# Patient Record
Sex: FEMALE | Race: WHITE
[De-identification: names, ages, dates, MRNs, and addresses within clinical notes are randomized per-mention and may not be internally consistent; named-entity substitution may affect disease eponyms.]

---

## 2017-08-04 ENCOUNTER — HOSPITAL ENCOUNTER (OUTPATIENT)
Dept: HOSPITAL 47 - RADECHMAIN | Age: 72
Discharge: HOME | End: 2017-08-04
Payer: MEDICARE

## 2017-08-04 DIAGNOSIS — I08.1: Primary | ICD-10-CM

## 2017-08-04 PROCEDURE — 93306 TTE W/DOPPLER COMPLETE: CPT

## 2017-08-05 NOTE — ECHOF
Referral Reason:R06.01 Orthopnea



MEASUREMENTS

--------

HEIGHT: 170.2 cm

WEIGHT: 115.2 kg

BP: 139/65

RVIDd:   2.4 cm     (< 3.3)

IVSd:   1.3 cm     (0.6 - 1.1)

LVIDd:   4.4 cm     (3.9 - 5.3)

LVPWd:   1.4 cm     (0.6 - 1.1)

IVSs:   1.7 cm

LVIDs:   2.7 cm

LVPWs:   1.8 cm

LAESV Index (A-L):   10.86 ml/m

Ao Diam:   3.3 cm     (2.0 - 3.7)

AV Cusp:   1.9 cm     (1.5 - 2.6)

LA Diam:   3.1 cm     (2.7 - 3.8)

MV EXCURSION:   18.048 mm     (> 18.000)

MV EF SLOPE:   58 mm/s     (70 - 150)

EPSS:   1.1 cm

MV E Louie:   0.68 m/s

MV DecT:   263 ms

MV A Louie:   0.80 m/s

MV E/A Ratio:   0.85 

RAP:   5.00 mmHg

RVSP:   10.82 mmHg







FINDINGS

--------

Sinus rhythm.

This was a technically adequate study.

The left ventricular size is normal.   There is 

moderate concentric left ventricular hypertrophy.   

Overall left ventricular systolic function is normal 

with, an EF between 55 - 60 %.

The right ventricle is normal in size and function.

Normal LA  size by volume 22+/-6 ml/m2.

The right atrium is normal in size.

The aortic valve is trileaflet, and appears 

structurally normal. No aortic stenosis or 

regurgitation.

The mitral valve is normal.   There is trace to mild 

mitral regurgitation.

Trace tricuspid regurgitation present.   There is no 

evidence of pulmonary hypertension.   The right 

ventricular systolic pressure, as measured by Doppler, 

is 10.82mmHg.

The pulmonic valve was not well visualized.   There is 

no pulmonic regurgitation present.

The aortic root size is normal.

Normal inferior vena cava with normal inspiratory 

collapse consistent with estimated right atrial 

pressure of  5 mmHg.

There is no pericardial effusion.



CONCLUSIONS

--------

1. Sinus rhythm.

2. The right ventricular systolic pressure, as measured by 

Doppler, is 10.82mmHg.

3. The pulmonic valve was not well visualized.

4. There is no pulmonic regurgitation present.

5. The aortic root size is normal.

6. There is no pericardial effusion.

7. This was a technically adequate study.

8. There is moderate concentric left ventricular 

hypertrophy.

9. Overall left ventricular systolic function is normal 

with, an EF between 55 - 60 %.

10. Normal LA size by volume 22+/-6 ml/m2.

11. The aortic valve is trileaflet, and appears 

structurally normal. No aortic stenosis or 

regurgitation.

12. There is trace to mild mitral regurgitation.

13. Trace tricuspid regurgitation present.

14. There is no evidence of pulmonary hypertension.





SONOGRAPHER: Raymon Leiva RDCS

## 2017-08-22 ENCOUNTER — HOSPITAL ENCOUNTER (OUTPATIENT)
Dept: HOSPITAL 47 - MNTWWP | Age: 72
Discharge: HOME | End: 2017-08-22
Payer: MEDICARE

## 2017-08-22 VITALS — BODY MASS INDEX: 38.8 KG/M2

## 2017-08-22 DIAGNOSIS — E66.01: Primary | ICD-10-CM

## 2017-08-22 PROCEDURE — 97802 MEDICAL NUTRITION INDIV IN: CPT

## 2020-08-14 ENCOUNTER — HOSPITAL ENCOUNTER (OUTPATIENT)
Dept: HOSPITAL 47 - ORWHC2ENDO | Age: 75
Discharge: HOME | End: 2020-08-14
Attending: INTERNAL MEDICINE
Payer: MEDICARE

## 2020-08-14 ENCOUNTER — HOSPITAL ENCOUNTER (INPATIENT)
Dept: HOSPITAL 47 - EC | Age: 75
LOS: 4 days | Discharge: HOME | DRG: 920 | End: 2020-08-18
Attending: HOSPITALIST | Admitting: HOSPITALIST
Payer: MEDICARE

## 2020-08-14 VITALS — RESPIRATION RATE: 17 BRPM | HEART RATE: 79 BPM | SYSTOLIC BLOOD PRESSURE: 122 MMHG | DIASTOLIC BLOOD PRESSURE: 62 MMHG

## 2020-08-14 VITALS — BODY MASS INDEX: 41.1 KG/M2

## 2020-08-14 VITALS — TEMPERATURE: 98.2 F

## 2020-08-14 DIAGNOSIS — Z88.8: ICD-10-CM

## 2020-08-14 DIAGNOSIS — Z96.652: ICD-10-CM

## 2020-08-14 DIAGNOSIS — D12.2: Primary | ICD-10-CM

## 2020-08-14 DIAGNOSIS — E07.9: ICD-10-CM

## 2020-08-14 DIAGNOSIS — Z88.2: ICD-10-CM

## 2020-08-14 DIAGNOSIS — Z82.5: ICD-10-CM

## 2020-08-14 DIAGNOSIS — I10: ICD-10-CM

## 2020-08-14 DIAGNOSIS — Z82.49: ICD-10-CM

## 2020-08-14 DIAGNOSIS — I78.0: ICD-10-CM

## 2020-08-14 DIAGNOSIS — E03.9: ICD-10-CM

## 2020-08-14 DIAGNOSIS — Z79.890: ICD-10-CM

## 2020-08-14 DIAGNOSIS — Z90.49: ICD-10-CM

## 2020-08-14 DIAGNOSIS — Z87.891: ICD-10-CM
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
## 2020-08-14 DIAGNOSIS — D12.4: ICD-10-CM

## 2020-08-14 DIAGNOSIS — K21.9: ICD-10-CM

## 2020-08-14 DIAGNOSIS — Z98.51: ICD-10-CM

## 2020-08-14 DIAGNOSIS — Z87.19: ICD-10-CM

## 2020-08-14 DIAGNOSIS — Z79.899: ICD-10-CM

## 2020-08-14 DIAGNOSIS — K91.840: Primary | ICD-10-CM

## 2020-08-14 DIAGNOSIS — D12.5: ICD-10-CM

## 2020-08-14 DIAGNOSIS — D62: ICD-10-CM

## 2020-08-14 DIAGNOSIS — K63.5: ICD-10-CM

## 2020-08-14 DIAGNOSIS — Z98.890: ICD-10-CM

## 2020-08-14 DIAGNOSIS — Z83.2: ICD-10-CM

## 2020-08-14 DIAGNOSIS — E87.2: ICD-10-CM

## 2020-08-14 LAB
ALBUMIN SERPL-MCNC: 4.1 G/DL (ref 3.5–5)
ALP SERPL-CCNC: 81 U/L (ref 38–126)
ALT SERPL-CCNC: 16 U/L (ref 4–34)
ANION GAP SERPL CALC-SCNC: 8 MMOL/L
APTT BLD: 22.4 SEC (ref 22–30)
AST SERPL-CCNC: 24 U/L (ref 14–36)
BASOPHILS # BLD AUTO: 0.1 K/UL (ref 0–0.2)
BASOPHILS NFR BLD AUTO: 1 %
BUN SERPL-SCNC: 14 MG/DL (ref 7–17)
CALCIUM SPEC-MCNC: 9.1 MG/DL (ref 8.4–10.2)
CHLORIDE SERPL-SCNC: 102 MMOL/L (ref 98–107)
CO2 SERPL-SCNC: 25 MMOL/L (ref 22–30)
EOSINOPHIL # BLD AUTO: 0.1 K/UL (ref 0–0.7)
EOSINOPHIL NFR BLD AUTO: 1 %
ERYTHROCYTE [DISTWIDTH] IN BLOOD BY AUTOMATED COUNT: 4.66 M/UL (ref 3.8–5.4)
ERYTHROCYTE [DISTWIDTH] IN BLOOD: 12.9 % (ref 11.5–15.5)
GLUCOSE BLD-MCNC: 134 MG/DL (ref 75–99)
GLUCOSE SERPL-MCNC: 126 MG/DL (ref 74–99)
HCT VFR BLD AUTO: 41.5 % (ref 34–46)
HGB BLD-MCNC: 13.7 GM/DL (ref 11.4–16)
INR PPP: 0.9 (ref ?–1.2)
LYMPHOCYTES # SPEC AUTO: 1.8 K/UL (ref 1–4.8)
LYMPHOCYTES NFR SPEC AUTO: 17 %
MCH RBC QN AUTO: 29.4 PG (ref 25–35)
MCHC RBC AUTO-ENTMCNC: 33 G/DL (ref 31–37)
MCV RBC AUTO: 89 FL (ref 80–100)
MONOCYTES # BLD AUTO: 0.5 K/UL (ref 0–1)
MONOCYTES NFR BLD AUTO: 5 %
NEUTROPHILS # BLD AUTO: 7.7 K/UL (ref 1.3–7.7)
NEUTROPHILS NFR BLD AUTO: 75 %
PLATELET # BLD AUTO: 287 K/UL (ref 150–450)
POTASSIUM SERPL-SCNC: 3.8 MMOL/L (ref 3.5–5.1)
PROT SERPL-MCNC: 6.8 G/DL (ref 6.3–8.2)
PT BLD: 9.8 SEC (ref 9–12)
SODIUM SERPL-SCNC: 135 MMOL/L (ref 137–145)
WBC # BLD AUTO: 10.3 K/UL (ref 3.8–10.6)

## 2020-08-14 PROCEDURE — 84132 ASSAY OF SERUM POTASSIUM: CPT

## 2020-08-14 PROCEDURE — 85730 THROMBOPLASTIN TIME PARTIAL: CPT

## 2020-08-14 PROCEDURE — 99284 EMERGENCY DEPT VISIT MOD MDM: CPT

## 2020-08-14 PROCEDURE — 86850 RBC ANTIBODY SCREEN: CPT

## 2020-08-14 PROCEDURE — 45382 COLONOSCOPY W/CONTROL BLEED: CPT

## 2020-08-14 PROCEDURE — 88305 TISSUE EXAM BY PATHOLOGIST: CPT

## 2020-08-14 PROCEDURE — 86900 BLOOD TYPING SEROLOGIC ABO: CPT

## 2020-08-14 PROCEDURE — 84484 ASSAY OF TROPONIN QUANT: CPT

## 2020-08-14 PROCEDURE — 80048 BASIC METABOLIC PNL TOTAL CA: CPT

## 2020-08-14 PROCEDURE — 85027 COMPLETE CBC AUTOMATED: CPT

## 2020-08-14 PROCEDURE — 96374 THER/PROPH/DIAG INJ IV PUSH: CPT

## 2020-08-14 PROCEDURE — 85610 PROTHROMBIN TIME: CPT

## 2020-08-14 PROCEDURE — 36415 COLL VENOUS BLD VENIPUNCTURE: CPT

## 2020-08-14 PROCEDURE — 45385 COLONOSCOPY W/LESION REMOVAL: CPT

## 2020-08-14 PROCEDURE — 93005 ELECTROCARDIOGRAM TRACING: CPT

## 2020-08-14 PROCEDURE — 80053 COMPREHEN METABOLIC PANEL: CPT

## 2020-08-14 PROCEDURE — 86901 BLOOD TYPING SEROLOGIC RH(D): CPT

## 2020-08-14 PROCEDURE — 85025 COMPLETE CBC W/AUTO DIFF WBC: CPT

## 2020-08-14 RX ADMIN — POTASSIUM CHLORIDE SCH MLS/HR: 14.9 INJECTION, SOLUTION INTRAVENOUS at 21:33

## 2020-08-14 NOTE — P.HPIM
History of Present Illness


H&P Date: 08/14/20





Patient is a 74-year-old female with a PMH of Osler-Weber-Rendu syndrome 

(diagnosed after mother had recurrent severe epistaxis and was diagnosed, with 

patient being subsequently diagnosed as well), hypertension, and hypothyroidism 

who presented to the ED with GI bleeding.  The patient underwent a diagnostic 

colonoscopy earlier today after a screening cologuard was abnormal.  Patient was

informed that she had 3 polyps that were removed and she was subsequently 

discharged in stable condition at around 1 PM to home.  She was at home when she

suddenly developed lower abdominal pressure sensation and subsequent bloody 

bowel movements at around 3 PM.  She reports that her initial bowel movement was

somewhat dark but then the subsequent movements became more and more bright red.

 She had 3 bowel movements at home, 3 while waiting in the emergency room, and 3

after being placed in a room in the emergency room.  She denied abdominal pain 

though notes experiencing some lightheadedness when trying to stand up.  Denied 

any additional complaints.  Denied any previous history of bleeding.  Denied 

nausea, vomiting, fever, chills, chest pain, or shortness of breath.  In the 

emergency room, hemoglobin was 13.7, platelets 287, troponin less than 0.012, 

sodium 135, potassium 3.8, BUN 14, and creatinine 0.73.  EKG revealed normal 

sinus rhythm at 93 bpm. 





Review of Systems





Pertinent positives and negatives as discussed in HPI, a complete review of 

systems was performed and all other systems are negative.





Past Medical History


Past Medical History: Blood Disorder, GERD/Reflux, Hypertension, Thyroid 

Disorder


Additional Past Medical History / Comment(s): HX OWRD (OSLER BUNCH RENDU 

DISEASE-BLOOD DISORDER).  + COLOGARD


History of Any Multi-Drug Resistant Organisms: None Reported


Past Surgical History: Appendectomy, Joint Replacement, Orthopedic Surgery, 

Tubal Ligation


Additional Past Surgical History / Comment(s): BILAT CTR.  LT TKA.  COLONOSCOPY


Past Anesthesia/Blood Transfusion Reactions: No Reported Reaction


Past Psychological History: No Psychological Hx Reported


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


Smoking Status: Former smoker


Past Alcohol Use History: None Reported


Additional Past Alcohol Use History / Comment(s): QUIT SMOKING 1985


Past Drug Use History: None Reported





- Past Family History


  ** Mother


Family Medical History: Blood Disorder, COPD


Additional Family Medical History / Comment(s): OWRD-BLEEDING DISORDER,





  ** Father


Family Medical History: Cancer, Congestive Heart Failure (CHF)


Additional Family Medical History / Comment(s): SKIN,





Medications and Allergies


                                Home Medications











 Medication  Instructions  Recorded  Confirmed  Type


 


Benazepril [Lotensin] 10 mg PO DAILY 08/12/20 08/14/20 History


 


Levothyroxine Sodium [Synthroid] 88 mcg PO DAILY 08/12/20 08/14/20 History


 


Omeprazole 20 mg PO DAILY 08/12/20 08/14/20 History


 


amLODIPine [Norvasc] 5 mg PO DAILY 08/12/20 08/14/20 History


 


hydroCHLOROthiazide [Hydrodiuril] 25 mg PO DAILY 08/12/20 08/14/20 History


 


metroNIDAZOLE [metroNIDAZOLE 0.75% 1 applic TOPICAL BID PRN 08/14/20 08/14/20 

History





Gel]    








                                    Allergies











Allergy/AdvReac Type Severity Reaction Status Date / Time


 


Sulfa (Sulfonamide Allergy  Anaphylaxis Verified 08/14/20 21:07





Antibiotics)     


 


rosuvastatin [From Crestor] AdvReac  SEVERE Verified 08/14/20 21:07





   JOINT PAIN  














Physical Exam


Vitals: 


                                   Vital Signs











  Temp Pulse Resp BP Pulse Ox


 


 08/14/20 21:07   88  181 H  132/85  95


 


 08/14/20 18:31  98.3 F  98  16  138/82  96








                                Intake and Output











 08/14/20 08/14/20 08/15/20





 14:59 22:59 06:59


 


Other:   


 


  Weight  120.1 kg 














General: non toxic, no distress, appears at stated age, morbidly obese


Derm: no unusual rashes/lesions no unusual ecchymoses, warm, dry


Head: atraumatic, normocephalic, symmetric


Eyes: EOMI, no lid lag, anicteric sclera, pupils equal round reactive to light


ENT: Nose and ears atraumatic, no thrush,  no pharyngeal erythema


Neck: No thyromegaly, no cervical lymphadenopathy, trachea midline, supple


Mouth: no lip lesion, mucus membranes moist


Cardiovascular: S1S2 reg, no murmur, positive posterior tibial pulse bilateral, 

no edema, capillary refill less than 2 seconds


Lungs: CTA bilateral, no rhonchi, no rales , no accessory muscle use


Abdominal: soft,  nontender to palpation, no guarding, no appreciable 

organomegaly, normal bowel sounds


Ext: no gross muscle atrophy,  muscle strength 5 out of 5 in all 4 extremities 

grossly, no contractures, 


Neuro:  CN II-XI grossly intact, light touch intact all 4 extremities, finger to

nose within normal limits,


Psych: Alert, oriented, appropriate affect 





Results


CBC & Chem 7: 


                                 08/14/20 19:30





                                 08/14/20 19:30


Labs: 


                  Abnormal Lab Results - Last 24 Hours (Table)











  08/14/20 08/14/20 Range/Units





  19:30 21:47 


 


Sodium  135 L   (137-145)  mmol/L


 


Glucose  126 H   (74-99)  mg/dL


 


POC Glucose (mg/dL)   134 H  (75-99)  mg/dL














Thrombosis Risk Factor Assmnt





- Choose All That Apply


Each Factor Represents 1 point: Obesity (BMI >25)


Each Risk Factor Represents 2 Points: Age 61-74 years


Thrombosis Risk Factor Assessment Total Risk Factor Score: 3


Thrombosis Risk Factor Assessment Level: Moderate Risk





Assessment and Plan


Plan: 





Acute GI bleed, postoperatively after multiple polyp removal, with history of 

Osler-Weber-Rendu syndrome


-Continue with Protonix 40 mg IVP twice a day


-GI consult


-Nothing by mouth for now


-Monitor CBC every 6 hourly


-Continue with IV fluids


-Cardiac monitoring





Chronic conditions: Hypertension, hypothyroidism


-Hold off on antihypertensives for now


-Continue with levothyroxine





DVT prophylaxis


-IPCDs





The patient is admitted with an anticipated greater than 2 midnight stay for 

evaluation of GI bleed


CODE STATUS: Full Code


Discussed with: Patient


Anticipated discharge date: 2-3 days


Anticipated discharge place: Home


A total of 40 minutes was spent on the care of this complex patient more than 

50% of the time was spent in counseling and care coordination.

## 2020-08-14 NOTE — P.PCN
Date of Procedure: 08/14/20


Implants: 


BRIEF HISTORY: Patient is a 74-year-old pleasant female scheduled for an 

elective colonoscopy as a part of evaluation of positive cologuard





PROCEDURE PERFORMED: Colonoscopy snare polypectomy. 





PREOPERATIVE DIAGNOSIS: positive cologuard 





IV sedation per Anesthesia. 





PROCEDURE: After informed consent was obtained, the patient, was brought into 

the endoscopy unit. IV sedation was administered by Anesthesia under continuous 

monitoring.  Digital rectal examination was normal. Initially the Olympus CF-160

flexible video colonoscope was then inserted in the rectum, gradually advanced 

into the cecum without any difficulty. Careful examination was performed as the 

scope was gradually being withdrawn. Ileocecal valve and the appendiceal orifice

were visualized and appeared normal.  There was a 3 cm broad-based polyp 

adjacent to the appendiceal orifice which was removed by piecemeal snare salvador

ypectomy and complete polypectomy could not be accomplished.  In the ascending 

colon there was a 1 cm polyp removed by snare polypectomy.  In the descending 

colon there was a 1 cm flat polyp removed by snare polypectomy.  In the; there 

was a 2 cm polyp removed by snare polypectomy.  The rectum appeared normal. 

Retroflexion was performed in the rectum and no lesions were seen. The patient 

tolerated the procedure well. 





IMPRESSION: 


3 cm broad-based cecal polyp adjacent to the appendiceal orifice and status post

snare polypectomy ( complete polypectomy not accomplished)


1 cm ascending colon polyp status post polypectomy


1. centimeters meter flat descending colon polyp status post polypectomy


2 cm; sigmoid colon polyp status post polypectomy





RECOMMENDATIONS:  Findings of this examination were discussed with the patient 

as well as a family.  She was advised to follow with the biopsy results.   If 

the biopsy results will plan a repeat colonoscopy in 3-6 months

## 2020-08-14 NOTE — ED
General Adult HPI





<Jose Beard - Last Filed: 08/14/20 20:52>





- General


Source: patient, RN notes reviewed


Mode of arrival: ambulatory


Limitations: no limitations





<Tadeo Sánchez - Last Filed: 08/14/20 20:59>





- General


Chief complaint: GI Bleed


Stated complaint: surgery complications


Time Seen by Provider: 08/14/20 19:04





- History of Present Illness


Initial comments: 





74-year-old female with a past medical history of GERD, hypertension, as Osler 

Bunch Rendu disease presents to the emergency room for a chief complaint of 

rectal bleeding.  Patient states she had a colonoscopy performed around 10 AM 

this morning where she reports that for polyps were removed.  This was performed

by Dr. Mazariegos.  Reports that she had had some bleeding since that time.  States 

it started as a dark red blood and then progressed to bright red blood.  Patient

reports that she has turned the toilet bowl water completely red about 5 times. 

Patient denies any other symptoms.  Patient called Dr. Mazariegos who reported she 

should return to the emergency room.Patient has no other complaints at this time

including shortness of breath, chest pain, abdominal pain, nausea or vomiting, 

headache, or visual changes. (Tadeo Sánchez)





- Related Data


                                Home Medications











 Medication  Instructions  Recorded  Confirmed


 


Benazepril [Lotensin] 10 mg PO DAILY 08/12/20 08/14/20


 


Levothyroxine Sodium [Synthroid] 88 mcg PO DAILY 08/12/20 08/14/20


 


Omeprazole 20 mg PO DAILY 08/12/20 08/14/20


 


amLODIPine [Norvasc] 5 mg PO DAILY 08/12/20 08/14/20


 


hydroCHLOROthiazide [Hydrodiuril] 25 mg PO DAILY 08/12/20 08/14/20











                                    Allergies











Allergy/AdvReac Type Severity Reaction Status Date / Time


 


rosuvastatin [From Crestor] AdvReac  SEVERE Verified 08/14/20 18:33





   JOINT PAIN  


 


Sulfa (Sulfonamide AdvReac  Anaphylaxis Verified 08/14/20 18:33





Antibiotics)     














Review of Systems


ROS Other: All systems not noted in ROS Statement are negative.





<Jose Beard - Last Filed: 08/14/20 20:52>


ROS Other: All systems not noted in ROS Statement are negative.





<Tadeo Sánchez - Last Filed: 08/14/20 20:59>


ROS Statement: 


Those systems with pertinent positive or pertinent negative responses have been 

documented in the HPI.








Past Medical History


Past Medical History: Blood Disorder, GERD/Reflux, Hypertension, Thyroid 

Disorder


Additional Past Medical History / Comment(s): HX OWRD (OSLER BUNCH RENDU 

DISEASE-BLOOD DISORDER).  + COLOGARD


History of Any Multi-Drug Resistant Organisms: None Reported


Past Surgical History: Appendectomy, Joint Replacement, Orthopedic Surgery, 

Tubal Ligation


Additional Past Surgical History / Comment(s): BILAT CTR.  LT TKA.  COLONOSCOPY


Past Anesthesia/Blood Transfusion Reactions: No Reported Reaction


Past Psychological History: No Psychological Hx Reported


Smoking Status: Former smoker


Past Alcohol Use History: None Reported


Past Drug Use History: None Reported





- Past Family History


  ** Mother


Family Medical History: Blood Disorder


Additional Family Medical History / Comment(s): OWRD-BLEEDING DISORDER





  ** Father


Family Medical History: Cancer


Additional Family Medical History / Comment(s): SKIN





<Tadeo Sánchez - Last Filed: 08/14/20 20:59>





General Exam


Limitations: no limitations


General appearance: alert, in no apparent distress


Head exam: Present: atraumatic, normocephalic, normal inspection


Eye exam: Present: normal appearance, PERRL, EOMI.  Absent: scleral icterus, 

conjunctival injection, periorbital swelling


ENT exam: Present: normal exam, mucous membranes moist


Neck exam: Present: normal inspection, full ROM.  Absent: tenderness, 

meningismus, lymphadenopathy


Respiratory exam: Present: normal lung sounds bilaterally.  Absent: respiratory 

distress, wheezes, rales, rhonchi, stridor


Cardiovascular Exam: Present: regular rate, normal rhythm, normal heart sounds. 

Absent: systolic murmur, diastolic murmur, rubs, gallop, clicks


GI/Abdominal exam: Present: soft, normal bowel sounds.  Absent: distended, 

tenderness, guarding, rebound, rigid


Neurological exam: Present: alert





<Tadeo Sánchez - Last Filed: 08/14/20 20:59>





Course





<Jose Beard - Last Filed: 08/14/20 20:52>


                                   Vital Signs











  08/14/20





  18:31


 


Temperature 98.3 F


 


Pulse Rate 98


 


Respiratory 16





Rate 


 


Blood Pressure 138/82


 


O2 Sat by Pulse 96





Oximetry 














- Reevaluation(s)


Reevaluation #1: 





08/14/20 20:47


Patient reevaluated and reexamined by myself, Dr. Beard.  I agree with PA 

findings.  This includes diagnostic interpretation and treatment plan.  Patient 

resting comfortably in bed.  Blood work and vital signs reviewed.  Patient 

updated on results and plan


08/14/20 20:52


Case discussed with Dr. salomon with GI who would like patient admitted to 

medicine.  [facility] physician group has been paged covering for Dr. Taylor.  Case 

also discussed with Dr. Whitten, who agrees with ICU admission and will consult. 

(Jose Beard)





EKG Findings





- EKG Comments:


EKG Findings:: Normal sinus rhythm 93.  .  QRS 84.  .  .  

Left axis.  Normal QRS.  No acute ST change.





<Jose Beard - Last Filed: 08/14/20 20:52>





Medical Decision Making





- Lab Data


Result diagrams: 


                                 08/14/20 19:30





                                 08/14/20 19:30





<Jose Beard - Last Filed: 08/14/20 20:52>





- Lab Data


Result diagrams: 


                                 08/14/20 19:30





                                 08/14/20 19:30





<Tadeo Sánchez - Last Filed: 08/14/20 20:59>





- Medical Decision Making





Vitals are stable.  CBC CMP is unremarkable.hemoglobin is stable at 13.7.  

Patient is well-appearing however on my evaluation she did have hematochezia 

noted in the toilet bowl.Dr. Mazariegos was contacted, rpatient was admitted to 

medicine. (Tadeo Sánchez)





- Lab Data


                                   Lab Results











  08/14/20 08/14/20 08/14/20 Range/Units





  19:30 19:30 19:30 


 


WBC  10.3    (3.8-10.6)  k/uL


 


RBC  4.66    (3.80-5.40)  m/uL


 


Hgb  13.7    (11.4-16.0)  gm/dL


 


Hct  41.5    (34.0-46.0)  %


 


MCV  89.0    (80.0-100.0)  fL


 


MCH  29.4    (25.0-35.0)  pg


 


MCHC  33.0    (31.0-37.0)  g/dL


 


RDW  12.9    (11.5-15.5)  %


 


Plt Count  287    (150-450)  k/uL


 


Neutrophils %  75    %


 


Lymphocytes %  17    %


 


Monocytes %  5    %


 


Eosinophils %  1    %


 


Basophils %  1    %


 


Neutrophils #  7.7    (1.3-7.7)  k/uL


 


Lymphocytes #  1.8    (1.0-4.8)  k/uL


 


Monocytes #  0.5    (0-1.0)  k/uL


 


Eosinophils #  0.1    (0-0.7)  k/uL


 


Basophils #  0.1    (0-0.2)  k/uL


 


PT   9.8   (9.0-12.0)  sec


 


INR   0.9   (<1.2)  


 


APTT   22.4   (22.0-30.0)  sec


 


Sodium    135 L  (137-145)  mmol/L


 


Potassium    3.8  (3.5-5.1)  mmol/L


 


Chloride    102  ()  mmol/L


 


Carbon Dioxide    25  (22-30)  mmol/L


 


Anion Gap    8  mmol/L


 


BUN    14  (7-17)  mg/dL


 


Creatinine    0.73  (0.52-1.04)  mg/dL


 


Est GFR (CKD-EPI)AfAm    >90  (>60 ml/min/1.73 sqM)  


 


Est GFR (CKD-EPI)NonAf    83  (>60 ml/min/1.73 sqM)  


 


Glucose    126 H  (74-99)  mg/dL


 


Calcium    9.1  (8.4-10.2)  mg/dL


 


Total Bilirubin    0.6  (0.2-1.3)  mg/dL


 


AST    24  (14-36)  U/L


 


ALT    16  (4-34)  U/L


 


Alkaline Phosphatase    81  ()  U/L


 


Troponin I     (0.000-0.034)  ng/mL


 


Total Protein    6.8  (6.3-8.2)  g/dL


 


Albumin    4.1  (3.5-5.0)  g/dL


 


Blood Type     


 


Blood Type Confirm     


 


Blood Type Recheck     


 


Bld Type Recheck Status     


 


Antibody Screen     


 


Spec Expiration Date     














  08/14/20 08/14/20 08/14/20 Range/Units





  19:30 19:30 20:10 


 


WBC     (3.8-10.6)  k/uL


 


RBC     (3.80-5.40)  m/uL


 


Hgb     (11.4-16.0)  gm/dL


 


Hct     (34.0-46.0)  %


 


MCV     (80.0-100.0)  fL


 


MCH     (25.0-35.0)  pg


 


MCHC     (31.0-37.0)  g/dL


 


RDW     (11.5-15.5)  %


 


Plt Count     (150-450)  k/uL


 


Neutrophils %     %


 


Lymphocytes %     %


 


Monocytes %     %


 


Eosinophils %     %


 


Basophils %     %


 


Neutrophils #     (1.3-7.7)  k/uL


 


Lymphocytes #     (1.0-4.8)  k/uL


 


Monocytes #     (0-1.0)  k/uL


 


Eosinophils #     (0-0.7)  k/uL


 


Basophils #     (0-0.2)  k/uL


 


PT     (9.0-12.0)  sec


 


INR     (<1.2)  


 


APTT     (22.0-30.0)  sec


 


Sodium     (137-145)  mmol/L


 


Potassium     (3.5-5.1)  mmol/L


 


Chloride     ()  mmol/L


 


Carbon Dioxide     (22-30)  mmol/L


 


Anion Gap     mmol/L


 


BUN     (7-17)  mg/dL


 


Creatinine     (0.52-1.04)  mg/dL


 


Est GFR (CKD-EPI)AfAm     (>60 ml/min/1.73 sqM)  


 


Est GFR (CKD-EPI)NonAf     (>60 ml/min/1.73 sqM)  


 


Glucose     (74-99)  mg/dL


 


Calcium     (8.4-10.2)  mg/dL


 


Total Bilirubin     (0.2-1.3)  mg/dL


 


AST     (14-36)  U/L


 


ALT     (4-34)  U/L


 


Alkaline Phosphatase     ()  U/L


 


Troponin I  <0.012    (0.000-0.034)  ng/mL


 


Total Protein     (6.3-8.2)  g/dL


 


Albumin     (3.5-5.0)  g/dL


 


Blood Type   AB Positive   


 


Blood Type Confirm    AB Positive  


 


Blood Type Recheck   No Previous Record   


 


Bld Type Recheck Status   CABO Indicated   


 


Antibody Screen   NEGATIVE   


 


Spec Expiration Date   08/17/2020 - 2330   














Disposition





<Jose Beard - Last Filed: 08/14/20 20:52>


Is patient prescribed a controlled substance at d/c from ED?: No


Time of Disposition: 20:59





<Tadeo Sánchez - Last Filed: 08/14/20 20:59>


Clinical Impression: 


 Hematochezia





Disposition: ADMITTED AS IP TO THIS HOSP


Referrals: 


Tariq Dasilva MD [Primary Care Provider] - 1-2 days

## 2020-08-15 LAB
ANION GAP SERPL CALC-SCNC: 6 MMOL/L
BASOPHILS # BLD AUTO: 0 K/UL (ref 0–0.2)
BASOPHILS # BLD AUTO: 0 K/UL (ref 0–0.2)
BASOPHILS NFR BLD AUTO: 0 %
BASOPHILS NFR BLD AUTO: 0 %
BUN SERPL-SCNC: 12 MG/DL (ref 7–17)
CALCIUM SPEC-MCNC: 8.4 MG/DL (ref 8.4–10.2)
CHLORIDE SERPL-SCNC: 104 MMOL/L (ref 98–107)
CO2 SERPL-SCNC: 23 MMOL/L (ref 22–30)
EOSINOPHIL # BLD AUTO: 0.1 K/UL (ref 0–0.7)
EOSINOPHIL # BLD AUTO: 0.1 K/UL (ref 0–0.7)
EOSINOPHIL NFR BLD AUTO: 1 %
EOSINOPHIL NFR BLD AUTO: 1 %
ERYTHROCYTE [DISTWIDTH] IN BLOOD BY AUTOMATED COUNT: 3.41 M/UL (ref 3.8–5.4)
ERYTHROCYTE [DISTWIDTH] IN BLOOD BY AUTOMATED COUNT: 3.67 M/UL (ref 3.8–5.4)
ERYTHROCYTE [DISTWIDTH] IN BLOOD BY AUTOMATED COUNT: 3.72 M/UL (ref 3.8–5.4)
ERYTHROCYTE [DISTWIDTH] IN BLOOD BY AUTOMATED COUNT: 3.81 M/UL (ref 3.8–5.4)
ERYTHROCYTE [DISTWIDTH] IN BLOOD BY AUTOMATED COUNT: 3.84 M/UL (ref 3.8–5.4)
ERYTHROCYTE [DISTWIDTH] IN BLOOD: 13 % (ref 11.5–15.5)
ERYTHROCYTE [DISTWIDTH] IN BLOOD: 13.2 % (ref 11.5–15.5)
ERYTHROCYTE [DISTWIDTH] IN BLOOD: 13.3 % (ref 11.5–15.5)
GLUCOSE BLD-MCNC: 127 MG/DL (ref 75–99)
GLUCOSE SERPL-MCNC: 127 MG/DL (ref 74–99)
HCT VFR BLD AUTO: 31 % (ref 34–46)
HCT VFR BLD AUTO: 34.3 % (ref 34–46)
HCT VFR BLD AUTO: 34.3 % (ref 34–46)
HCT VFR BLD AUTO: 34.5 % (ref 34–46)
HCT VFR BLD AUTO: 34.7 % (ref 34–46)
HGB BLD-MCNC: 10.2 GM/DL (ref 11.4–16)
HGB BLD-MCNC: 10.7 GM/DL (ref 11.4–16)
HGB BLD-MCNC: 11.1 GM/DL (ref 11.4–16)
HGB BLD-MCNC: 11.3 GM/DL (ref 11.4–16)
HGB BLD-MCNC: 11.6 GM/DL (ref 11.4–16)
LYMPHOCYTES # SPEC AUTO: 1.2 K/UL (ref 1–4.8)
LYMPHOCYTES # SPEC AUTO: 1.9 K/UL (ref 1–4.8)
LYMPHOCYTES NFR SPEC AUTO: 15 %
LYMPHOCYTES NFR SPEC AUTO: 27 %
MCH RBC QN AUTO: 29.2 PG (ref 25–35)
MCH RBC QN AUTO: 29.6 PG (ref 25–35)
MCH RBC QN AUTO: 29.8 PG (ref 25–35)
MCH RBC QN AUTO: 30 PG (ref 25–35)
MCH RBC QN AUTO: 30.2 PG (ref 25–35)
MCHC RBC AUTO-ENTMCNC: 31.3 G/DL (ref 31–37)
MCHC RBC AUTO-ENTMCNC: 32.3 G/DL (ref 31–37)
MCHC RBC AUTO-ENTMCNC: 32.8 G/DL (ref 31–37)
MCHC RBC AUTO-ENTMCNC: 32.9 G/DL (ref 31–37)
MCHC RBC AUTO-ENTMCNC: 33.5 G/DL (ref 31–37)
MCV RBC AUTO: 90.3 FL (ref 80–100)
MCV RBC AUTO: 90.4 FL (ref 80–100)
MCV RBC AUTO: 91.1 FL (ref 80–100)
MCV RBC AUTO: 92.1 FL (ref 80–100)
MCV RBC AUTO: 93.3 FL (ref 80–100)
MONOCYTES # BLD AUTO: 0.3 K/UL (ref 0–1)
MONOCYTES # BLD AUTO: 0.4 K/UL (ref 0–1)
MONOCYTES NFR BLD AUTO: 4 %
MONOCYTES NFR BLD AUTO: 5 %
NEUTROPHILS # BLD AUTO: 4.6 K/UL (ref 1.3–7.7)
NEUTROPHILS # BLD AUTO: 6.5 K/UL (ref 1.3–7.7)
NEUTROPHILS NFR BLD AUTO: 65 %
NEUTROPHILS NFR BLD AUTO: 80 %
PLATELET # BLD AUTO: 222 K/UL (ref 150–450)
PLATELET # BLD AUTO: 223 K/UL (ref 150–450)
PLATELET # BLD AUTO: 225 K/UL (ref 150–450)
PLATELET # BLD AUTO: 233 K/UL (ref 150–450)
PLATELET # BLD AUTO: 243 K/UL (ref 150–450)
POTASSIUM SERPL-SCNC: 3.5 MMOL/L (ref 3.5–5.1)
SODIUM SERPL-SCNC: 133 MMOL/L (ref 137–145)
WBC # BLD AUTO: 7.1 K/UL (ref 3.8–10.6)
WBC # BLD AUTO: 7.3 K/UL (ref 3.8–10.6)
WBC # BLD AUTO: 7.4 K/UL (ref 3.8–10.6)
WBC # BLD AUTO: 8.1 K/UL (ref 3.8–10.6)
WBC # BLD AUTO: 8.2 K/UL (ref 3.8–10.6)

## 2020-08-15 RX ADMIN — CEFAZOLIN SCH MLS/HR: 330 INJECTION, POWDER, FOR SOLUTION INTRAMUSCULAR; INTRAVENOUS at 10:28

## 2020-08-15 RX ADMIN — POTASSIUM CHLORIDE SCH MLS/HR: 14.9 INJECTION, SOLUTION INTRAVENOUS at 05:46

## 2020-08-15 RX ADMIN — CEFAZOLIN SCH MLS/HR: 330 INJECTION, POWDER, FOR SOLUTION INTRAMUSCULAR; INTRAVENOUS at 20:08

## 2020-08-15 RX ADMIN — PANTOPRAZOLE SODIUM SCH MG: 40 INJECTION, POWDER, FOR SOLUTION INTRAVENOUS at 08:45

## 2020-08-15 RX ADMIN — POTASSIUM CHLORIDE SCH MEQ: 20 TABLET, EXTENDED RELEASE ORAL at 05:47

## 2020-08-15 RX ADMIN — LEVOTHYROXINE SODIUM SCH MCG: 88 TABLET ORAL at 05:47

## 2020-08-15 RX ADMIN — POTASSIUM CHLORIDE SCH MEQ: 20 TABLET, EXTENDED RELEASE ORAL at 06:48

## 2020-08-15 RX ADMIN — PANTOPRAZOLE SODIUM SCH MG: 40 INJECTION, POWDER, FOR SOLUTION INTRAVENOUS at 20:08

## 2020-08-15 NOTE — P.CONS
History of Present Illness





- Reason for Consult


Consult date: 08/15/20


Blood per rectum


Requesting physician: Cesar Evans





- Chief Complaint


Blood per rectum





- History of Present Illness





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 

Rendu syndrome, hypertension, hypothyroidism and recent polypectomy presented to

the hospital due to complaints of blood per rectum.  The patient had undergone 

colonoscopic evaluation for investigation of a positive Cologard test with 

removal of multiple large polyps including a incompletely resected cecal polyp. 

The patient went home where she developed multiple episodes of bright red blood 

per rectum.  She had approximately 2 prior to presentation and 5 since that 

time.  She's had no further episodes since this morning at 6:30 AM.  She felt 

her last episode was more burgundy and dark in color than the bright red blood 

which she previously seen.  No complaints of nausea, vomiting or any abdominal 

pain at this time.  Hemoglobin stable at 11.1.  Hemodynamically the patient is 

stable and being monitored in the ICU.








Review of Systems





REVIEW OF SYSTEMS:


CONSTITUTIONAL: Denies any fevers, chills, weight change or fatigue.


CARDIOVASCULAR: Denies any chest pain, palpitations high or low blood pressures


RESPIRATORY: Denies any shortness of breath, hemoptysis or cough.  


GENITOURINARY:  No dysuria or hematuria. 


MUSCULOSKELETAL: No weakness reported. 


SKIN: Denies any new rashes or lesions, jaundice or pallor. 


PSYCHIATRIC: Denies any depression or anxiety. 


NEUROLOGY: Denies headache, denies any new focal deficits. 


EARS/NOSE/THROAT: No recent hearing change, congestion, nasal discharge or sore 

throat.


EYES: No pain in eyes, discharge or change in vision. 


GASTROINTESTINAL: As per HPI.





Past Medical History


Past Medical History: Blood Disorder, GERD/Reflux, Hypertension, Thyroid 

Disorder


Additional Past Medical History / Comment(s): HX OWRD (OSLER BUNCH RENDU 

DISEASE-BLOOD DISORDER).  + COLOGARD


History of Any Multi-Drug Resistant Organisms: None Reported


Past Surgical History: Appendectomy, Joint Replacement, Orthopedic Surgery, 

Tubal Ligation


Additional Past Surgical History / Comment(s): BILAT CTR.  LT TKA.  COLONOSCOPY


Past Anesthesia/Blood Transfusion Reactions: No Reported Reaction


Past Psychological History: No Psychological Hx Reported


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


Smoking Status: Former smoker


Past Alcohol Use History: None Reported


Additional Past Alcohol Use History / Comment(s): QUIT SMOKING 1985


Past Drug Use History: None Reported





- Past Family History


  ** Mother


Family Medical History: Blood Disorder, COPD


Additional Family Medical History / Comment(s): OWRD-BLEEDING DISORDER,





  ** Father


Family Medical History: Cancer, Congestive Heart Failure (CHF)


Additional Family Medical History / Comment(s): SKIN,





Medications and Allergies


                                Home Medications











 Medication  Instructions  Recorded  Confirmed  Type


 


Benazepril [Lotensin] 10 mg PO DAILY 08/12/20 08/14/20 History


 


Levothyroxine Sodium [Synthroid] 88 mcg PO DAILY 08/12/20 08/14/20 History


 


Omeprazole 20 mg PO DAILY 08/12/20 08/14/20 History


 


amLODIPine [Norvasc] 5 mg PO DAILY 08/12/20 08/14/20 History


 


hydroCHLOROthiazide [Hydrodiuril] 25 mg PO DAILY 08/12/20 08/14/20 History


 


metroNIDAZOLE [metroNIDAZOLE 0.75% 1 applic TOPICAL BID PRN 08/14/20 08/14/20 

History





Gel]    








                                    Allergies











Allergy/AdvReac Type Severity Reaction Status Date / Time


 


Sulfa (Sulfonamide Allergy  Anaphylaxis Verified 08/14/20 21:07





Antibiotics)     


 


rosuvastatin [From Crestor] AdvReac  SEVERE Verified 08/14/20 21:07





   JOINT PAIN  














Physical Exam


Vitals: 


                                   Vital Signs











  Temp Pulse Resp BP Pulse Ox


 


 08/15/20 11:00   70  19  112/81  97


 


 08/15/20 10:00   62  16  121/67  97


 


 08/15/20 09:00   69  18  122/65  96


 


 08/15/20 08:00  98.0 F  66  17  118/68  95


 


 08/15/20 07:45    18  


 


 08/15/20 07:00   64  19  124/74  97


 


 08/15/20 06:00   63  20  125/66  97


 


 08/15/20 05:00   72  12  136/82  96


 


 08/15/20 04:00  97.7 F  98  29 H  121/76  92 L


 


 08/15/20 03:00   64  18  121/69  97


 


 08/15/20 02:00   65  13  124/71  94 L


 


 08/15/20 01:00    16  117/67  94 L


 


 08/15/20 00:19   68  20  117/67  93 L


 


 08/15/20 00:00   71  13  119/65  93 L


 


 08/14/20 23:00  97.7 F  77  14  117/74  93 L


 


 08/14/20 22:00   84  13  130/79  95


 


 08/14/20 21:07   88  181 H  132/85  95


 


 08/14/20 18:31  98.3 F  98  16  138/82  96








                                Intake and Output











 08/14/20 08/15/20 08/15/20





 22:59 06:59 14:59


 


Intake Total 125 1000 500


 


Output Total 120 350 750


 


Balance 5 650 -250


 


Intake:   


 


   1000 500


 


    Dextrose 5%-0.45% NaCl 1, 125 1000 500





    000 ml @ 125 mls/hr IV .   





    Q8H [facility] Rx#:[id number]   


 


Output:   


 


  Urine   750


 


  Stool 120  


 


  Urine/Stool Mix  350 


 


Other:   


 


  Voiding Method   Bedside Commode


 


  # Voids  0 0


 


  # Bowel Movements 1 1 


 


  Weight 120.1 kg 120.3 kg 














On physical examination, patient appears comfortable in no apparent distress. 


HEAD: Normocephalic, atraumatic. 


EYES: No scleral icterus. No conjunctival injection. 


MOUTH: No lesions, tongue midline. 


NECK: Trachea midline, no gross abnormalities. 


CHEST: Clear to auscultation with no wheezing or rhonchi appreciated. 


HEART: S1 appreciated. 


ABDOMEN: Soft, obese. Bowel sounds are positive. No organomegaly.  No guarding 

or rigidity.


EXTREMITIES: No pedal edema. 


SKIN: No rashes, no jaundice. 


NEUROLOGIC: Alert and oriented x3.  No focal deficits. 





Results


CBC & Chem 7: 


                                 08/15/20 17:06





                                 08/15/20 09:01


Labs: 


                  Abnormal Lab Results - Last 24 Hours (Table)











  08/14/20 08/14/20 08/15/20 Range/Units





  19:30 21:47 04:56 


 


RBC     (3.80-5.40)  m/uL


 


Hgb    11.3 L  (11.4-16.0)  gm/dL


 


Sodium  135 L    (137-145)  mmol/L


 


Glucose  126 H    (74-99)  mg/dL


 


POC Glucose (mg/dL)   134 H   (75-99)  mg/dL














  08/15/20 08/15/20 08/15/20 Range/Units





  04:56 05:59 11:31 


 


RBC    3.72 L  (3.80-5.40)  m/uL


 


Hgb    11.1 L  (11.4-16.0)  gm/dL


 


Sodium  133 L    (137-145)  mmol/L


 


Glucose  127 H    (74-99)  mg/dL


 


POC Glucose (mg/dL)   127 H   (75-99)  mg/dL














Assessment and Plan


(1) Post-polypectomy bleeding


Narrative/Plan: 


74-year-old female with multiple medical comorbidities who presented after 

colonoscopy earlier in the day with removal of multiple large polyps including a

large cecal polyp which was incompletely resected.  Patient had multiple 

episodes of painless bright red blood per rectum.  Hemodynamically she remains 

stable but was admitted to the ICU for further monitoring.  Last bowel movement 

was darker in color and she's had no bleeding during the day.  Hemoglobin has 

remained stable at 11.1 today.


Current Visit: Yes   Status: Acute   Code(s): LHH4644 -    SNOMED Code(s): 

351036271


   





(2) History of colon polyps


Current Visit: Yes   Status: Acute   Code(s): Z86.010 - PERSONAL HISTORY OF 

COLONIC POLYPS   SNOMED Code(s): 272412275


   





(3) Hematochezia


Current Visit: Yes   Status: Acute   Code(s): K92.1 - MELENA   SNOMED Code(s): 

221715355


   


Plan: 





Supportive care


Okay for liquid diet


Continue to monitor hemoglobin and hematocrit every 6 hours


Await pathology from polypectomies


Patient will need repeat colonoscopy in 3-6 months


The patient has further bleeding or precipitous fall in hemoglobin may require 

endoscopy for control of bleeding


Thank you for allowing us to participate in the care of the patient

## 2020-08-15 NOTE — P.PN
Subjective


Progress Note Date: 08/15/20


Principal diagnosis: 


GI bleed














Patient is a 74-year-old female with a history of Osler-[place]-Rendu syndrome, 

hypertension, and hypothyroidism who presented to the emergency department 

secondary to GI bleed. She had a colonoscopy in the morning of 8/14 with 

polypectomy. 





Patient seen and examined at bedside.  She has not had any additional bright red

bowel movements since this morning.  Denies any nausea, vomiting, or abdominal 

pain.  She does not have any history of GI bleed in the past.  She states that 

her symptoms of Osler [place] to syndrome of are typically nose bleeds as 

well as finger telangiectasias.  She denies any chest pain, shortness breath, or

lightheadedness.





General: non toxic, no distress, appears at stated ageDerm: warm, dry


Head: atraumatic, normocephalic, symmetric


Eyes: EOMI, no lid lag, anicteric sclera


Mouth: no lip lesion, mucus membranes moist


Cardiovascular: S1S2 reg, no murmur, positive posterior tibial pulse bilateral, 


Lungs: CTA bilateral, no rhonchi, no rales , no accessory muscle use


Abdominal: soft,  nontender to palpation, no guarding, no appreciable 

organomegaly


Ext: no gross muscle atrophy, no edema, no contractures


Neuro:  CN II-XI grossly intact, no focal neuro deficits


Psych: Alert, oriented, appropriate affect 











Blood per rectum, with GI bleed secondary to polypectomy removal


-Hemoglobin stable


-Clear liquid diet


-GI recommendations


-Continue cycle CBCs


-IV fluids





Acute blood loss anemia


- due to GI bleed


- Follow CBC


- No indication for transfusion at this point in time





Hypertension


- resume norvasc and ACEI


- Resume HCTZ in AM





Hypothyroidism


- Statin





DVT prophylaxis: SCDs


Discussed with: patient, nursing


Anticipated discharge: in AM


Anticipated discharge place: home 


A total of 25 minutes was spent on the care of this complex patient more than 

50% of the time was spent in counseling and care coordination. 








Objective





- Vital Signs


Vital signs: 


                                   Vital Signs











Temp  98.0 F   08/15/20 08:00


 


Pulse  67   08/15/20 12:00


 


Resp  18   08/15/20 12:00


 


BP  125/63   08/15/20 12:00


 


Pulse Ox  98   08/15/20 12:00








                                 Intake & Output











 08/14/20 08/15/20 08/15/20





 18:59 06:59 18:59


 


Intake Total  1125 500


 


Output Total  470 750


 


Balance  655 -250


 


Weight 118.388 kg 120.3 kg 


 


Intake:   


 


  IV  1125 500


 


    Dextrose 5%-0.45% NaCl 1,  1125 500





    000 ml @ 125 mls/hr IV .   





    Q8H [facility] Rx#:[id number]   


 


Output:   


 


  Urine   750


 


  Stool  120 


 


  Urine/Stool Mix  350 


 


Other:   


 


  Voiding Method   Bedside Commode


 


  # Voids  0 0


 


  # Bowel Movements  1 














- Labs


CBC & Chem 7: 


                                 08/15/20 11:31





                                 08/15/20 09:01


Labs: 


                  Abnormal Lab Results - Last 24 Hours (Table)











  08/14/20 08/14/20 08/15/20 Range/Units





  19:30 21:47 04:56 


 


RBC     (3.80-5.40)  m/uL


 


Hgb    11.3 L  (11.4-16.0)  gm/dL


 


Sodium  135 L    (137-145)  mmol/L


 


Glucose  126 H    (74-99)  mg/dL


 


POC Glucose (mg/dL)   134 H   (75-99)  mg/dL














  08/15/20 08/15/20 08/15/20 Range/Units





  04:56 05:59 11:31 


 


RBC    3.72 L  (3.80-5.40)  m/uL


 


Hgb    11.1 L  (11.4-16.0)  gm/dL


 


Sodium  133 L    (137-145)  mmol/L


 


Glucose  127 H    (74-99)  mg/dL


 


POC Glucose (mg/dL)   127 H   (75-99)  mg/dL

## 2020-08-15 NOTE — P.CNPUL
History of Present Illness


Consult date: 08/15/20


Requesting physician: Cesar Evans


Chief complaint: Bright red blood per rectum.


History of present illness: 





This is a 74-year-old female, had colonoscopy on 8/14/20..  This was done mostly

because a screening cologuard Guarded was abnormal.  Patient was found to have 3

polyps that were removed, and patient was discharged home.  After she was 

discharged home, patient developed lower abdominal pressure sensation and sub

sequently bloody bowel movements around 3 PM.  Initial bowel movement was noted 

to be dark, then she noticed bright red blood per rectum.  She had basically 

bright red blood per rectum, noted in 3 bowel movements at home.  Then the 

patient was brought into the ER, and by now she was complaining of some 

lightheadedness.  Patient had a drop in her hemoglobin from 13.7-11.1 in one 

day.  Patient was admitted to the ICU, and this consult was initiated.  

Presently asymptomatic, no further episodes of bloody bowel movements.  

Presently asymptomatic, denies any nausea vomiting or abdominal pain, denies any

lightheadedness.  Patient did not require any blood transfusion, and she is yet 

to be seen by gastroenterology on consultation.





Review of Systems





Constitutional: Denies weight loss fever or chills.


HEENT: Negative.  Except for history of epistaxis secondary to Osler [place] 

Rendu syndrome.


Pulmonary: Negative.


GI: As noted in HPI.


Cardiac: Negative.


Genitourinary: Negative.


Hematologic: History of [place]ler [place] Rendu syndrome.


Endocrine: Negative 


psychiatric: Negative


Neurologic: Negative


Skin: Negative


Musculoskeletal: Negative


Lymphatics: Negative





Past Medical History


Past Medical History: Blood Disorder, GERD/Reflux, Hypertension, Thyroid 

Disorder


Additional Past Medical History / Comment(s): HX OWRD (OSLER BUNCH RENDU 

DISEASE-BLOOD DISORDER).  + COLOGARD


History of Any Multi-Drug Resistant Organisms: None Reported


Past Surgical History: Appendectomy, Joint Replacement, Orthopedic Surgery, 

Tubal Ligation


Additional Past Surgical History / Comment(s): BILAT CTR.  LT TKA.  COLONOSCOPY


Past Anesthesia/Blood Transfusion Reactions: No Reported Reaction


Past Psychological History: No Psychological Hx Reported


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


Smoking Status: Former smoker


Past Alcohol Use History: None Reported


Additional Past Alcohol Use History / Comment(s): QUIT SMOKING 1985


Past Drug Use History: None Reported





- Past Family History


  ** Mother


Family Medical History: Blood Disorder, COPD


Additional Family Medical History / Comment(s): OWRD-BLEEDING DISORDER,





  ** Father


Family Medical History: Cancer, Congestive Heart Failure (CHF)


Additional Family Medical History / Comment(s): SKIN,





Medications and Allergies


                                Home Medications











 Medication  Instructions  Recorded  Confirmed  Type


 


Benazepril [Lotensin] 10 mg PO DAILY 08/12/20 08/14/20 History


 


Levothyroxine Sodium [Synthroid] 88 mcg PO DAILY 08/12/20 08/14/20 History


 


Omeprazole 20 mg PO DAILY 08/12/20 08/14/20 History


 


amLODIPine [Norvasc] 5 mg PO DAILY 08/12/20 08/14/20 History


 


hydroCHLOROthiazide [Hydrodiuril] 25 mg PO DAILY 08/12/20 08/14/20 History


 


metroNIDAZOLE [metroNIDAZOLE 0.75% 1 applic TOPICAL BID PRN 08/14/20 08/14/20 

History





Gel]    








                                    Allergies











Allergy/AdvReac Type Severity Reaction Status Date / Time


 


Sulfa (Sulfonamide Allergy  Anaphylaxis Verified 08/14/20 21:07





Antibiotics)     


 


rosuvastatin [From Crestor] AdvReac  SEVERE Verified 08/14/20 21:07





   JOINT PAIN  














Physical Exam


Vitals: 


                                   Vital Signs











  Temp Pulse Resp BP Pulse Ox


 


 08/15/20 12:00   67  18  125/63  98


 


 08/15/20 11:00   70  19  112/81  97


 


 08/15/20 10:00   62  16  121/67  97


 


 08/15/20 09:00   69  18  122/65  96


 


 08/15/20 08:00  98.0 F  66  17  118/68  95


 


 08/15/20 07:45    18  


 


 08/15/20 07:00   64  19  124/74  97


 


 08/15/20 06:00   63  20  125/66  97


 


 08/15/20 05:00   72  12  136/82  96


 


 08/15/20 04:00  97.7 F  98  29 H  121/76  92 L


 


 08/15/20 03:00   64  18  121/69  97


 


 08/15/20 02:00   65  13  124/71  94 L


 


 08/15/20 01:00    16  117/67  94 L


 


 08/15/20 00:19   68  20  117/67  93 L


 


 08/15/20 00:00   71  13  119/65  93 L


 


 08/14/20 23:00  97.7 F  77  14  117/74  93 L


 


 08/14/20 22:00   84  13  130/79  95


 


 08/14/20 21:07   88  181 H  132/85  95


 


 08/14/20 18:31  98.3 F  98  16  138/82  96








                                Intake and Output











 08/14/20 08/15/20 08/15/20





 22:59 06:59 14:59


 


Intake Total 125 1000 500


 


Output Total 120 350 750


 


Balance 5 650 -250


 


Intake:   


 


   1000 500


 


    Dextrose 5%-0.45% NaCl 1, 125 1000 500





    000 ml @ 125 mls/hr IV .   





    Q8H [facility] Rx#:[id number]   


 


Output:   


 


  Urine   750


 


  Stool 120  


 


  Urine/Stool Mix  350 


 


Other:   


 


  Voiding Method   Bedside Commode


 


  # Voids  0 0


 


  # Bowel Movements 1 1 


 


  Weight 120.1 kg 120.3 kg 














Physical Exam: Revealed a 74-year-old female in no distress.  Very pleasant.


Head: Atraumatic, normocephalic.


HEENT:[Neck is supple.] [No neck masses.] [No thyromegaly.] [No JVD.]


Chest: [Clear throughout, no crackles, no rhonchi, no wheezes.]


Cardiac Exam: [Normal S1 and S2, no S3 gallop, no murmur.]


Abdomen: [Soft, nontender,  no megaly, no rebound, no guarding, normal bowel 

sounds.]


Extremities: [No clubbing, no edema, no cyanosis.]


Neurological Exam: [No focal neurologic deficit.]  Alert and oriented 3.


Psychiatric: Normal mood, affect and normal mental status examination.


Skin: No rashes.





Results





- Laboratory Findings


CBC and BMP: 


                                 08/15/20 11:31





                                 08/15/20 09:01


PT/INR, D-dimer











PT  9.8 sec (9.0-12.0)   08/14/20  19:30    


 


INR  0.9  (<1.2)   08/14/20  19:30    








Abnormal lab findings: 


                                  Abnormal Labs











  08/14/20 08/14/20 08/15/20





  19:30 21:47 04:56


 


RBC   


 


Hgb    11.3 L


 


Sodium  135 L  


 


Glucose  126 H  


 


POC Glucose (mg/dL)   134 H 














  08/15/20 08/15/20 08/15/20





  04:56 05:59 11:31


 


RBC    3.72 L


 


Hgb    11.1 L


 


Sodium  133 L  


 


Glucose  127 H  


 


POC Glucose (mg/dL)   127 H 














Assessment and Plan


Assessment: 





Impression:


Acute lower GI bleeding most likely secondary to recent polypectomies and 

history of [place]ler [place] Rendu syndrome.


History of hypertension


History of hypothyroidism





Recommendation:


Continue to monitor the patient for now.


GI to see on consultation.


Agree with Protonix empirically although this is mostly a lower GI bleed unless 

for otherwise.


Continue to monitor CBC every 6 hours.


Continue IV fluids.


Transfer patient to a regular medical floor assuming she remains stable in the 

next few hours.


Time with Patient: Greater than 30

## 2020-08-16 LAB
ANION GAP SERPL CALC-SCNC: 6 MMOL/L
BUN SERPL-SCNC: 7 MG/DL (ref 7–17)
CALCIUM SPEC-MCNC: 8.7 MG/DL (ref 8.4–10.2)
CHLORIDE SERPL-SCNC: 108 MMOL/L (ref 98–107)
CO2 SERPL-SCNC: 21 MMOL/L (ref 22–30)
ERYTHROCYTE [DISTWIDTH] IN BLOOD BY AUTOMATED COUNT: 3.62 M/UL (ref 3.8–5.4)
ERYTHROCYTE [DISTWIDTH] IN BLOOD BY AUTOMATED COUNT: 3.64 M/UL (ref 3.8–5.4)
ERYTHROCYTE [DISTWIDTH] IN BLOOD: 13.1 % (ref 11.5–15.5)
ERYTHROCYTE [DISTWIDTH] IN BLOOD: 13.3 % (ref 11.5–15.5)
GLUCOSE SERPL-MCNC: 118 MG/DL (ref 74–99)
HCT VFR BLD AUTO: 33.7 % (ref 34–46)
HCT VFR BLD AUTO: 33.9 % (ref 34–46)
HGB BLD-MCNC: 10.8 GM/DL (ref 11.4–16)
HGB BLD-MCNC: 11.1 GM/DL (ref 11.4–16)
MCH RBC QN AUTO: 30 PG (ref 25–35)
MCH RBC QN AUTO: 30.4 PG (ref 25–35)
MCHC RBC AUTO-ENTMCNC: 32.2 G/DL (ref 31–37)
MCHC RBC AUTO-ENTMCNC: 32.6 G/DL (ref 31–37)
MCV RBC AUTO: 93.1 FL (ref 80–100)
MCV RBC AUTO: 93.1 FL (ref 80–100)
PLATELET # BLD AUTO: 225 K/UL (ref 150–450)
PLATELET # BLD AUTO: 230 K/UL (ref 150–450)
POTASSIUM SERPL-SCNC: 3.9 MMOL/L (ref 3.5–5.1)
SODIUM SERPL-SCNC: 135 MMOL/L (ref 137–145)
WBC # BLD AUTO: 7.4 K/UL (ref 3.8–10.6)
WBC # BLD AUTO: 8.4 K/UL (ref 3.8–10.6)

## 2020-08-16 RX ADMIN — PANTOPRAZOLE SODIUM SCH MG: 40 INJECTION, POWDER, FOR SOLUTION INTRAVENOUS at 07:54

## 2020-08-16 RX ADMIN — LEVOTHYROXINE SODIUM SCH MCG: 88 TABLET ORAL at 06:11

## 2020-08-16 RX ADMIN — PANTOPRAZOLE SODIUM SCH MG: 40 INJECTION, POWDER, FOR SOLUTION INTRAVENOUS at 21:54

## 2020-08-16 NOTE — P.PN
Subjective


Progress Note Date: 08/16/20


Principal diagnosis: 





Hematochezia





Patient seen and examined.  No acute events overnight patient reports 2-3 bowel 

movements today bright red blood per rectum.  She denies any chest pain, 

shortness breath or palpitations.  No nausea or vomiting.  No fever or chills.  

No dizziness.





Objective





- Vital Signs


Vital signs: 


                                   Vital Signs











Temp  98.7 F   08/16/20 14:30


 


Pulse  83   08/16/20 14:30


 


Resp  16   08/16/20 14:30


 


BP  121/75   08/16/20 14:30


 


Pulse Ox  95   08/16/20 14:30








                                 Intake & Output











 08/15/20 08/16/20 08/16/20





 18:59 06:59 18:59


 


Intake Total 750 800 


 


Output Total 1500  


 


Balance -750 800 


 


Weight  120.6 kg 


 


Intake:   


 


   800 


 


    Dextrose 5%-0.45% NaCl 1, 500  





    000 ml @ 125 mls/hr IV .   





    Q8H [facility] Rx#:[id number]   


 


    Sodium Chloride 0.9% 1, 250 800 





    000 ml @ 50 mls/hr IV .   





    Q20H [facility] Rx#:[id number]   


 


Output:   


 


  Urine 1500  


 


Other:   


 


  Voiding Method Bedside Commode Toilet Toilet


 


  # Voids 1 1 2


 


  # Bowel Movements  1 2














- Exam





General: [non toxic], [no distress], [appears at stated age]


Derm: [warm], [dry]


Head: [atraumatic], [normocephalic], [symmetric]


Eyes: [EOMI], [no lid lag], [anicteric sclera]


Mouth: [no lip lesion], [mucus membranes moist]


Cardiovascular: [S1S2 reg], [no murmur], [positive posterior tibial pulse 

bilateral], 


Lungs: [CTA bilateral], [no rhonchi, no rales] , [no accessory muscle use]


Abdominal: [soft], [ nontender to palpation], [no guarding], [no appreciable 

organomegaly]


Ext: [no gross muscle atrophy], [no edema], [no contractures]


Neuro: [ CN II-XI grossly intact], [no focal neuro deficits]


Psych: [Alert], [oriented], [appropriate affect] 





- Labs


CBC & Chem 7: 


                                 08/16/20 04:40





                                 08/16/20 04:40


Labs: 


                  Abnormal Lab Results - Last 24 Hours (Table)











  08/15/20 08/15/20 08/16/20 Range/Units





  17:06 22:58 04:40 


 


RBC  3.67 L  3.41 L   (3.80-5.40)  m/uL


 


Hgb  10.7 L  10.2 L   (11.4-16.0)  gm/dL


 


Hct   31.0 L   (34.0-46.0)  %


 


Sodium    135 L  (137-145)  mmol/L


 


Chloride    108 H  ()  mmol/L


 


Carbon Dioxide    21 L  (22-30)  mmol/L


 


Glucose    118 H  (74-99)  mg/dL














  08/16/20 Range/Units





  04:40 


 


RBC  3.64 L  (3.80-5.40)  m/uL


 


Hgb  11.1 L  (11.4-16.0)  gm/dL


 


Hct  33.9 L  (34.0-46.0)  %


 


Sodium   (137-145)  mmol/L


 


Chloride   ()  mmol/L


 


Carbon Dioxide   (22-30)  mmol/L


 


Glucose   (74-99)  mg/dL














Assessment and Plan


Assessment: 





Blood per rectum, with GI bleed secondary to polypectomy removal


-Hemoglobin stable


-Clear liquid diet


-GI recommendations


-Continue cycle CBCs


-Protonix IV twice a day





Hyperchloremic metabolic acidosis


-Likely due to infused IVF.


-DC IVF and encourage hydration by mouth





Acute blood loss anemia


- due to GI bleed


- Follow CBC


- No indication for transfusion at this point in time





Hypertension


- resume norvasc and ACEI


- Resume HCTZ in AM





Hypothyroidism


- Statin





[Patient with continued rectal bleeding.  Hemoglobin stable.  GI on board.  P

ossible colonoscopy tomorrow.  She is pending clinical improvement.  Likely DC 

in 1-2 days.]

## 2020-08-16 NOTE — P.PN
Subjective


Progress Note Date: 08/16/20





Objective





- Vital Signs


Vital signs: 


                                   Vital Signs











Temp  97.9 F   08/16/20 10:06


 


Pulse  89   08/16/20 10:06


 


Resp  16   08/16/20 10:06


 


BP  125/77   08/16/20 10:06


 


Pulse Ox  97   08/16/20 10:06








                                 Intake & Output











 08/15/20 08/16/20 08/16/20





 18:59 06:59 18:59


 


Intake Total 750 800 


 


Output Total 1500  


 


Balance -750 800 


 


Weight  120.6 kg 


 


Intake:   


 


   800 


 


    Dextrose 5%-0.45% NaCl 1, 500  





    000 ml @ 125 mls/hr IV .   





    Q8H BARRIE Rx#:[id number]   


 


    Sodium Chloride 0.9% 1, 250 800 





    000 ml @ 50 mls/hr IV .   





    Q20H BARRIE Rx#:[id number]   


 


Output:   


 


  Urine 1500  


 


Other:   


 


  Voiding Method Bedside Commode Toilet Toilet


 


  # Voids 1 1 


 


  # Bowel Movements  1 














- Labs


CBC & Chem 7: 


                                 08/16/20 19:22





                                 08/16/20 04:40


Labs: 


                  Abnormal Lab Results - Last 24 Hours (Table)











  08/15/20 08/15/20 08/15/20 Range/Units





  11:31 17:06 22:58 


 


RBC  3.72 L  3.67 L  3.41 L  (3.80-5.40)  m/uL


 


Hgb  11.1 L  10.7 L  10.2 L  (11.4-16.0)  gm/dL


 


Hct    31.0 L  (34.0-46.0)  %


 


Sodium     (137-145)  mmol/L


 


Chloride     ()  mmol/L


 


Carbon Dioxide     (22-30)  mmol/L


 


Glucose     (74-99)  mg/dL














  08/16/20 08/16/20 Range/Units





  04:40 04:40 


 


RBC   3.64 L  (3.80-5.40)  m/uL


 


Hgb   11.1 L  (11.4-16.0)  gm/dL


 


Hct   33.9 L  (34.0-46.0)  %


 


Sodium  135 L   (137-145)  mmol/L


 


Chloride  108 H   ()  mmol/L


 


Carbon Dioxide  21 L   (22-30)  mmol/L


 


Glucose  118 H   (74-99)  mg/dL














Assessment and Plan


(1) Post-polypectomy bleeding


Narrative/Plan: 


74-year-old female with multiple medical comorbidities who presented after 

colonoscopy earlier in the day with removal of multiple large polyps including a

large cecal polyp which was incompletely resected.  Patient had multiple 

episodes of painless bright red blood per rectum.  Hemodynamically she remains 

stable but was admitted to the ICU for further monitoring.  


Patient's hemoglobin remained stable at 11.1, but she continues to have episodes

of bright red blood per rectum.


Current Visit: Yes   Status: Acute   Code(s): HBH5973 -    SNOMED Code(s): 

970198075


   





(2) History of colon polyps


Current Visit: Yes   Status: Acute   Code(s): Z86.010 - PERSONAL HISTORY OF 

COLONIC POLYPS   SNOMED Code(s): 729778944


   





(3) Hematochezia


Current Visit: Yes   Status: Acute   Code(s): K92.1 - MELENA   SNOMED Code(s): 

644973326


   


Plan: 





Supportive care


Okay for liquid diet


Continue to monitor hemoglobin and hematocrit every 6 hours


Bowel prep ordered


We'll proceed to colonoscopy tomorrow patient is a patient has continued to have

episodes of GI bleeding


Thank you for allowing us to participate in the care of the patient

## 2020-08-17 LAB
ANION GAP SERPL CALC-SCNC: 6 MMOL/L
BUN SERPL-SCNC: 6 MG/DL (ref 7–17)
CALCIUM SPEC-MCNC: 8.6 MG/DL (ref 8.4–10.2)
CHLORIDE SERPL-SCNC: 105 MMOL/L (ref 98–107)
CO2 SERPL-SCNC: 26 MMOL/L (ref 22–30)
ERYTHROCYTE [DISTWIDTH] IN BLOOD BY AUTOMATED COUNT: 3.37 M/UL (ref 3.8–5.4)
ERYTHROCYTE [DISTWIDTH] IN BLOOD: 13.4 % (ref 11.5–15.5)
GLUCOSE SERPL-MCNC: 97 MG/DL (ref 74–99)
HCT VFR BLD AUTO: 31 % (ref 34–46)
HGB BLD-MCNC: 10.2 GM/DL (ref 11.4–16)
MCH RBC QN AUTO: 30.1 PG (ref 25–35)
MCHC RBC AUTO-ENTMCNC: 32.7 G/DL (ref 31–37)
MCV RBC AUTO: 92 FL (ref 80–100)
PLATELET # BLD AUTO: 210 K/UL (ref 150–450)
POTASSIUM SERPL-SCNC: 3.7 MMOL/L (ref 3.5–5.1)
SODIUM SERPL-SCNC: 137 MMOL/L (ref 137–145)
WBC # BLD AUTO: 5.9 K/UL (ref 3.8–10.6)

## 2020-08-17 PROCEDURE — 0W3P8ZZ CONTROL BLEEDING IN GASTROINTESTINAL TRACT, VIA NATURAL OR ARTIFICIAL OPENING ENDOSCOPIC: ICD-10-PCS

## 2020-08-17 RX ADMIN — PANTOPRAZOLE SODIUM SCH MG: 40 INJECTION, POWDER, FOR SOLUTION INTRAVENOUS at 08:32

## 2020-08-17 RX ADMIN — PANTOPRAZOLE SODIUM SCH MG: 40 INJECTION, POWDER, FOR SOLUTION INTRAVENOUS at 20:27

## 2020-08-17 RX ADMIN — LEVOTHYROXINE SODIUM SCH MCG: 88 TABLET ORAL at 06:14

## 2020-08-17 NOTE — P.DS
Providers


Date of admission: 


08/14/20 20:54





Expected date of discharge: 08/17/20


Attending physician: 


Cesar Evans MD





Consults: 





                                        





08/14/20 20:54


Consult Physician Stat 


   Consulting Provider: Waylon Whitten


   Consult Reason/Comments: critical care


   Do you want consulting provider notified?: Already Contacted


Consult Physician Stat 


   Consulting Provider: Janes Chin


   Consult Reason/Comments: gi hemorrhage


   Do you want consulting provider notified?: Already Contacted











Primary care physician: 


[facility] Course: 





Patient is a 74-year-old female with a history of Osler-Weber-Rendu syndrome, 

hypertension, and hypothyroidism who presented to the emergency department 

secondary to GI bleed. She had a colonoscopy in the morning of 8/14 with 

polypectomy.  She had a bloody bowel movement on August 16.  Her hemoglobin 

remained stable.  GI was consulted and recommended colonoscopy on August 17.





Patient was seen and examined.  No acute events overnight.  Patient reports 

liquid bowel movement without blood this morning.  She denies any dizziness.  

She denies any chest pain, shortness breath or palpitations.  No nausea or 

vomiting.  No fever or chills.  Plans for colonoscopy today.





General: [non toxic], [no distress], [appears at stated age]


Derm: [warm], [dry]


Head: [atraumatic], [normocephalic], [symmetric]


Eyes: [EOMI], [no lid lag], [anicteric sclera]


Mouth: [no lip lesion], [mucus membranes moist]


Cardiovascular: [S1S2 reg], [no murmur], [positive posterior tibial pulse 

bilateral], 


Lungs: [CTA bilateral], [no rhonchi, no rales] , [no accessory muscle use]


Abdominal: [soft], [ nontender to palpation], [no guarding], [no appreciable 

organomegaly]


Ext: [no gross muscle atrophy], [no edema], [no contractures]


Neuro: [ CN II-XI grossly intact], [no focal neuro deficits]


Psych: [Alert], [oriented], [appropriate affect] 





Blood per rectum, with GI bleed secondary to polypectomy removal


-Hemoglobin stable


-Clear liquid diet


-GI recommendations, plans for colonoscopy today


-Continue cycle CBCs


-Protonix IV twice a day





Acute blood loss anemia


- due to GI bleed


- Follow CBC


- No indication for transfusion at this point in time





Hypertension


- resume norvasc and ACEI


- Resume HCTZ 





Hypothyroidism


- Statin





[Patient with continued rectal bleeding.  Hemoglobin stable.  GI on board.  

Colonoscopy today.  She is pending clinical improvement.  DC planning depending 

on results of colonoscopy.  This complex discharge took about 35 minutes to 

complete.]





Procedures: 





Colonoscopy


Patient Condition at Discharge: Stable





Plan - Discharge Summary


Discharge Rx Participant: Yes


New Discharge Prescriptions: 


Continue


   Levothyroxine Sodium [Synthroid] 88 mcg PO DAILY


   hydroCHLOROthiazide [Hydrodiuril] 25 mg PO DAILY


   amLODIPine [Norvasc] 5 mg PO DAILY


   Benazepril [Lotensin] 10 mg PO DAILY


   Omeprazole 20 mg PO DAILY


   metroNIDAZOLE [metroNIDAZOLE 0.75% Gel] 1 applic TOPICAL BID PRN


     PRN Reason: FACE


Discharge Medication List





Benazepril [Lotensin] 10 mg PO DAILY 08/12/20 [History]


Levothyroxine Sodium [Synthroid] 88 mcg PO DAILY 08/12/20 [History]


Omeprazole 20 mg PO DAILY 08/12/20 [History]


amLODIPine [Norvasc] 5 mg PO DAILY 08/12/20 [History]


hydroCHLOROthiazide [Hydrodiuril] 25 mg PO DAILY 08/12/20 [History]


metroNIDAZOLE [metroNIDAZOLE 0.75% Gel] 1 applic TOPICAL BID PRN 08/14/20 

[History]








Follow up Appointment(s)/Referral(s): 


Sheridan Macias PAC [REFERRING] - 08/26/20 8:00 am


Tariq Dasilva MD [Primary Care Provider] - 08/21/20 10:15 am


()


Activity/Diet/Wound Care/Special Instructions: 


Diet: Low-salt


Follow-up PCP within 3 days.  Follow-up with GI within 1 week.  Take all 

medications as advised.  Come back to the ED or call 911 for worsening chest 

pain, shortness of breath, palpitations, dizziness or rectal bleeding.


Discharge Disposition: HOME SELF-CARE

## 2020-08-17 NOTE — P.PCN
Date of Procedure: 08/17/20


Procedure(s) Performed: 


BRIEF HISTORY: Patient is a 72-year-old pleasant white female admitted hospital 

with post polypectomy lower GI bleed.  She had an elective colonoscopy on August 14 and 4 polyps were removed.  The largest polyp was too the base of the cecum 

measuring about 3 cm and broad-based that was removed in a piecemeal fashion.  

Patient was discharged home from recovery and 6 of was later she started having 

rectal bleeding.  She was subsequently admitted to the hospital.  She dropped 

hemoglobin from 12-10 g/dL.  She continued to have some bleeding yesterday 

evening and hence she is scheduled for colonoscopy for management of post-

polypectomy lower GI bleed. 


 


PROCEDURE PERFORMED: Colonoscopy with Endo Clip placement. 





PREOPERATIVE DIAGNOSIS: Acute post polypectomy lower GI bleed. 





IV sedation per Anesthesia. 





PROCEDURE: After informed consent was obtained, the patient, was brought into 

the endoscopy unit. IV sedation was administered by Anesthesia under continuous 

monitoring.  Digital rectal examination was normal. Initially the Olympus CF-160

flexible video colonoscope was then inserted in the rectum, gradually advanced 

into the cecum without any difficulty. Careful examination was performed as the 

scope was gradually being withdrawn. Ileocecal valve and the appendiceal orifice

were visualized and appeared normal.  Prep was fair.  In the base of the cecum 

at the site of previous polypectomy there was a 3 cm linear ulceration 

identified with no active bleeding or clots noted.  This appeared to be the 

source of recent was polypectomy bleed.  I placed 2 endoclips on the ulcer.  The

rest of the cecum, ascending colon, transverse colon, appeared normal.  The  

polypectomy site in the descending colon and sigmoid colon were visualized and 

appeared to have small superficial ulceration but no active bleeding.  The 

rectum appeared normal. Retroflexion was performed in the rectum and no lesions 

were seen. The patient tolerated the procedure well. 





IMPRESSION: 


Slightly centimeter linear ulceration noted at the site of polypectomy in the 

base of the cecum with no active bleeding is status post Endo Clip placement as 

described above


Polypectomy site noted in the descending colon and sigmoid colon with no 

evidence of bleeding.





RECOMMENDATIONS:  Findings of this examination were discussed with the patient 

as well as a family.  At this time she'll start on a full liquid diet.  Watch 

for 1 more day.  If she is stable she can be discharged home tomorrow morning.

## 2020-08-18 VITALS
SYSTOLIC BLOOD PRESSURE: 117 MMHG | TEMPERATURE: 98.3 F | RESPIRATION RATE: 18 BRPM | HEART RATE: 78 BPM | DIASTOLIC BLOOD PRESSURE: 76 MMHG

## 2020-08-18 LAB
ERYTHROCYTE [DISTWIDTH] IN BLOOD BY AUTOMATED COUNT: 3.46 M/UL (ref 3.8–5.4)
ERYTHROCYTE [DISTWIDTH] IN BLOOD: 13.3 % (ref 11.5–15.5)
HCT VFR BLD AUTO: 31.7 % (ref 34–46)
HGB BLD-MCNC: 10.2 GM/DL (ref 11.4–16)
MCH RBC QN AUTO: 29.6 PG (ref 25–35)
MCHC RBC AUTO-ENTMCNC: 32.3 G/DL (ref 31–37)
MCV RBC AUTO: 91.6 FL (ref 80–100)
PLATELET # BLD AUTO: 222 K/UL (ref 150–450)
WBC # BLD AUTO: 7.6 K/UL (ref 3.8–10.6)

## 2020-08-18 RX ADMIN — PANTOPRAZOLE SODIUM SCH MG: 40 INJECTION, POWDER, FOR SOLUTION INTRAVENOUS at 08:13

## 2020-08-18 RX ADMIN — LEVOTHYROXINE SODIUM SCH MCG: 88 TABLET ORAL at 05:58

## 2020-08-18 NOTE — PN
PROGRESS NOTE



DATE OF DICTATION:

08/18/2020



The patient is a 74-year-old pleasant white female admitted to the hospital with post-

polypectomy lower GI bleed.  She dropped her hemoglobin to 10.5 g/dL.  She underwent a

colonoscopy yesterday which revealed an ulceration in the cecum at the site of previous

polypectomy and Endoclips were placed.  The patient doing well. No further bleeding.

Had 2 bowel movements today. Hemoglobin was 10.2 g/dL.



PHYSICAL EXAMINATION:

Appears comfortable.

VITAL SIGNS:  Stable. Blood pressure 117/76, pulse rate 78, temperature 98.3.

HEENT examination unremarkable. Conjunctivae pink. Sclerae anicteric. Oral cavity no

lesions.

NECK: No JVD or lymph node enlargement.

CHEST: Clear to auscultation.

HEART:  Regular rate and rhythm.

ABDOMEN:  Soft.  Bowel sounds are positive.  No organomegaly.

EXTREMITIES: No pedal edema.

NEUROLOGIC:  Alert and oriented x3.  No focal deficits.



LABS:

WBC 7.6, hemoglobin 10.2, platelets normal.



IMPRESSION:

1. Acute post-polypectomy lower gastrointestinal bleed, status post colonoscopy

    yesterday with Endoclip placement. Patient doing well. No further bleeding.

    Hemoglobin stable at 10.2 g/dL.

2. History of colon polyps noted on recent colonoscopy 5 days ago.



RECOMMENDATIONS:

1. Advance diet.

2. She can be discharged home today with outpatient followup in 2 weeks to discuss the

    biopsy results. Thank you for this consultation.



MMEZEKIELL / IJN: [id number] / Job#: [id number]

## 2021-10-05 ENCOUNTER — HOSPITAL ENCOUNTER (OUTPATIENT)
Dept: HOSPITAL 47 - SLEEP | Age: 76
End: 2021-10-05
Attending: INTERNAL MEDICINE
Payer: MEDICARE

## 2021-10-05 DIAGNOSIS — E03.9: ICD-10-CM

## 2021-10-05 DIAGNOSIS — E66.9: ICD-10-CM

## 2021-10-05 DIAGNOSIS — G47.10: Primary | ICD-10-CM

## 2021-10-05 DIAGNOSIS — Z88.8: ICD-10-CM

## 2021-10-05 DIAGNOSIS — I10: ICD-10-CM

## 2021-10-05 DIAGNOSIS — Z79.899: ICD-10-CM

## 2021-10-05 DIAGNOSIS — Z88.2: ICD-10-CM

## 2021-10-05 DIAGNOSIS — Z87.891: ICD-10-CM

## 2021-10-05 PROCEDURE — 99211 OFF/OP EST MAY X REQ PHY/QHP: CPT

## 2021-10-05 NOTE — CONS
CONSULTATION



REASON FOR CONSULTATION:

Suspected sleep apnea.



This is an obese 76-year-old female patient was referred to me for sleep evaluation.

They were concerned about her sleep quality.  I have taken care of this patient's

 who passed away approximately a year ago.  She was the main caregiver.  She

used to wake up constant in the middle of night to attend on his medical needs.  Since

he passed away, the patient's sleep quality has not been the same.  The patient

essentially is going to bed around 10 o'clock and she is getting out of bed somewhere

between 4 and 5:00 am in the morning.  In the middle of the night, she would wake up on

several occasions to use the bathroom around midnight and around 2:00 a.m. usually.

Whenever she is awake, she gets up and she eats a piece of cake and she drinks coffee

and she reads books and it is hard for her to go to sleep.  Ultimately, she would get

out of bed at around 5 o'clock and she will take another small nap and she may take a

nap or 2 during the day.  This has been an ongoing pattern for over the past one year.

At the same time, she snores.  She occasionally grinds her teeth.  She has been told

also to have some issues with sleepwalking.  She does not take any form of sleep aid or

sleeping pill.  No major hypersomnia or sleepiness during the day.  No recent weight

gain or weight loss.  No history of anxiety.  No history of depression.  No history of

any chronic pain or shortness of breath or angina overnight.  No substance abuse.  No

alcoholism.  Her current [place] score is 7.



PAST MEDICAL HISTORY:

Obesity, hypertension, hypothyroidism.



PAST SURGICAL HISTORY:

Include appendectomy, carpal tunnel release bilaterally, tubal ligation, and total knee

replacement and resection of a skin cancer.



DRUG ALLERGIES:

SULFA AND CRESTOR.



MEDICATION:

Include Norvasc, benazepril, levothyroxine, hydrochlorothiazide, omeprazole.



SOCIAL HISTORY:

She is an ex-smoker.  No history of alcoholism.  No history of drugs.



FAMILY HISTORY:

Negative for sleep apnea.



REVIEW OF SYSTEMS:

Fourteen-point review of system was done and positive findings are mentioned in history

of present illness.  Of significance is her is the absence of any falling asleep while

driving and she denies being involved in a motor vehicle accident because of feeling

drowsy or sleepy.  She feels that she is sleeping a good 5-6 hours.  She prefers to

sleep on her side.  She does read books in her bedroom environment.  She takes

afternoon naps.  Overall, she is drinking around 4-5 cups of coffee, and she also

drinks coffee while she wakes up in the middle of the night.  As mentioned, she has

taken around 3-4 naps during the day.  No restlessness in lower extremities.  No

episodes of waking up, choking or gasping for air.



PHYSICAL EXAMINATION:

BP is 159/88, pulse 82, respirations 20, temperature 97.5.  Saturation 95% on room air.

BMI is 42.6.  Arn[place] score is 7.  Neck size 17 inches. Weight is 270, height is 5 feet

7 inches. GENERAL appearance: Calm, comfortable.

HEAD atraumatic, normocephalic.

NECK:  Supple.  No JVD.  No goiter. No neck masses. Mallampati class 4.

LUNGS: Diminished otherwise clear.

HEART:  Heart sounds are regular rate and rhythm.  Normal S1, S2.  No S3, S4.  No

murmurs.

ABDOMEN:  Soft, nontender.  No organomegaly.

EXTREMITIES:  No edema. No cyanosis or clubbing.

NEUROLOGICALLY: The patient is awake and alert.  There are no focal neurological

deficits.



IMPRESSION:

1. Hypersomnia, Arnolds[place] score of 7.  Since loss of her  to medical illness, the

    patient has involved herself in various activities which has impaired her sleep

    quality in general, her sleep hygiene measures have been quite poor and at the same

    time, there may be a low likelihood that the patient may have an underlying sleep

    apnea.  Nevertheless, fixing her sleeping habits and sleep schedule and hygiene

    measures which should be a priority in this patient prior to committing her for

    further testing.

2. Obesity.

3. Hypertension.

4. Hypothyroidism.



PLAN:

I am recommending the following for this patient:

1. Cut down the fluid intake at least 3 hours prior to bedtime.

2. Take a diuretic in the morning rather than at nighttime to prevent nocturnal

    urination and arousals.

3. Awakenings that occur in the middle of the night should not be followed up by any

    eating activities or drinking caffeine.  In fact, the patient was asked to cut down

    any form of caffeinated beverages at least 3 hours prior to going to sleep.  She is

    to implement that.

4. Exercise late in the afternoon should help to improve her sleep quality and

    efficiency.

5. Avoid taking naps during the day and maintain a regular sleep schedule.

6. Encourage weight loss.

7. Sleep apnea polysomnography will be done only if the above-mentioned measures fail

    to improve the patient's sleep.  We will continue to follow.  The patient will

    contact me back within next few months to discuss her progress.

8. Meanwhile she is going on a trip to [place] to visit family.  She is going to

    work on these sleep hygiene measures and will let me know her progress.





RENA / CRISTINA: [id number] / Job#: [id number]

## 2021-10-15 ENCOUNTER — HOSPITAL ENCOUNTER (EMERGENCY)
Dept: HOSPITAL 47 - EC | Age: 76
Discharge: HOME | End: 2021-10-15
Payer: MEDICARE

## 2021-10-15 VITALS — SYSTOLIC BLOOD PRESSURE: 134 MMHG | HEART RATE: 87 BPM | TEMPERATURE: 98.1 F | DIASTOLIC BLOOD PRESSURE: 83 MMHG

## 2021-10-15 VITALS — RESPIRATION RATE: 18 BRPM

## 2021-10-15 DIAGNOSIS — Z79.899: ICD-10-CM

## 2021-10-15 DIAGNOSIS — Z90.49: ICD-10-CM

## 2021-10-15 DIAGNOSIS — Z88.2: ICD-10-CM

## 2021-10-15 DIAGNOSIS — Z79.890: ICD-10-CM

## 2021-10-15 DIAGNOSIS — M54.16: Primary | ICD-10-CM

## 2021-10-15 DIAGNOSIS — Z82.49: ICD-10-CM

## 2021-10-15 DIAGNOSIS — K21.9: ICD-10-CM

## 2021-10-15 DIAGNOSIS — I10: ICD-10-CM

## 2021-10-15 DIAGNOSIS — Z87.891: ICD-10-CM

## 2021-10-15 DIAGNOSIS — Z79.52: ICD-10-CM
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
## 2021-10-15 PROCEDURE — 99284 EMERGENCY DEPT VISIT MOD MDM: CPT

## 2021-10-15 PROCEDURE — 72100 X-RAY EXAM L-S SPINE 2/3 VWS: CPT

## 2021-10-15 NOTE — ED
Fall HPI





- General


Chief Complaint: Fall


Stated Complaint: Fell on knees and hips


Time Seen by Provider: 10/15/21 15:36


Source: patient, RN notes reviewed


Mode of arrival: wheelchair


Limitations: no limitations





- History of Present Illness


Initial Comments: 





Patient is a 76-year-old female presenting to the emergency Department with 

complaints of some lower back pain and radiation into the right leg.  She states

about one week ago she fell on her bottom steps, with her right leg going 

forward and her left leg was behind her.  She states she was able to get up and 

only had very minimal pain at that time.  Over the next couple days started 

developing some mild low back pain.  A couple days ago she lifted a heavy 

container and then afterwards noticed some more back pain.  Over the last 1-2 

days she states the pain in her low back is worse, she has pain radiating down 

into her right leg as well.  She denies any fevers or chills, no numbness and t

ingling into her lower extremities, no saddle paresthesias, no bowel or bladder 

incontinence.  She denies any previous surgeries to her low back.  She did go to

another medical facility, she states it is a computed tomography scan of her 

abdomen region and thinks it got her back but is not sure, they stated they did 

not find anything.  She has no further complaints at this time.





- Related Data


                                Home Medications











 Medication  Instructions  Recorded  Confirmed


 


Benazepril [Lotensin] 10 mg PO DAILY 08/12/20 10/15/21


 


Levothyroxine Sodium [Synthroid] 88 mcg PO DAILY 08/12/20 10/15/21


 


Omeprazole 20 mg PO DAILY 08/12/20 10/15/21


 


amLODIPine [Norvasc] 5 mg PO DAILY 08/12/20 10/15/21


 


hydroCHLOROthiazide [Hydrodiuril] 25 mg PO DAILY 08/12/20 10/15/21








                                  Previous Rx's











 Medication  Instructions  Recorded


 


Cyclobenzaprine [Flexeril] 5 mg PO TID PRN #15 tablet 10/15/21


 


predniSONE 50 mg PO DAILY #5 tab 10/15/21











                                    Allergies











Allergy/AdvReac Type Severity Reaction Status Date / Time


 


Sulfa (Sulfonamide Allergy  Anaphylaxis Verified 10/15/21 17:06





Antibiotics)     


 


rosuvastatin [From Crestor] AdvReac  SEVERE Verified 10/15/21 17:06





   JOINT PAIN  














Review of Systems


ROS Statement: 


Those systems with pertinent positive or pertinent negative responses have been 

documented in the HPI.





ROS Other: All systems not noted in ROS Statement are negative.





Past Medical History


Past Medical History: Blood Disorder, GERD/Reflux, Hypertension, Thyroid 

Disorder


Additional Past Medical History / Comment(s): HX OWRD (OSLER BUNCH RENDU 

DISEASE-BLOOD DISORDER).  + COLOGARD


History of Any Multi-Drug Resistant Organisms: None Reported


Past Surgical History: Appendectomy, Joint Replacement, Orthopedic Surgery, 

Tubal Ligation


Additional Past Surgical History / Comment(s): BILAT CTR.  LT TKA.  COLONOSCOPY.

 knee replacment


Past Anesthesia/Blood Transfusion Reactions: No Reported Reaction


Past Psychological History: No Psychological Hx Reported


Smoking Status: Former smoker


Past Alcohol Use History: None Reported


Past Drug Use History: None Reported





- Past Family History


  ** Mother


Family Medical History: Blood Disorder, COPD


Additional Family Medical History / Comment(s): OWRD-BLEEDING DISORDER,





  ** Father


Family Medical History: Cancer, Congestive Heart Failure (CHF)


Additional Family Medical History / Comment(s): SKIN,





General Exam





- General Exam Comments


Initial Comments: 





GENERAL: 


Patient is well-developed and well-nourished.  Patient is nontoxic and in no 

acute distress.





HEAD: 


Atraumatic, normocephalic.





EYES:


Pupils equal round and reactive to light, extraocular movements intact, sclera 

anicteric, conjunctiva are normal.  Eyelids were unremarkable.





ENT: 


Moist mucous membranes.





NECK: 


Normal range of motion, supple without lymphadenopathy or JVD.





LUNGS:


Unlabored respirations.  Breath sounds clear to auscultation bilaterally and 

equal.  No wheezes rales or rhonchi.





HEART:


Regular rate and rhythm without murmurs, rubs or gallops.





ABDOMEN: 


Soft, nontender, normoactive bowel sounds.  No guarding, no rebound.  No masses 

appreciated.





: Deferred 





MUSCULOSKELETAL: 


Normal extremities with adequate strength and normal range of motion, no pitting

or edema.  No clubbing or cyanosis.  Mild tenderness to palpation of the lumbar 

region, more so on the right lower side.





NEUROLOGICAL: 


Patient is alert and oriented x 3.  Motor and sensory are also intact.  Cranial 

nerves II through XII grossly intact.  Symmetrical smile.  Normal speech, normal

gait.   





PSYCH:


Normal mood, normal affect.





SKIN:


 Warm, Dry, normal turgor, no rashes or lesions noted.


Limitations: no limitations





Course


                                   Vital Signs











  10/15/21 10/15/21





  15:01 17:50


 


Temperature 98.8 F 98.1 F


 


Pulse Rate 69 87


 


Respiratory 18 18





Rate  


 


Blood Pressure 134/74 134/83


 


O2 Sat by Pulse 96 96





Oximetry  














Medical Decision Making





- Medical Decision Making





Patient is a 76-year-old female here with low back pain and pain radiating to 

the right leg after she fell about a week ago.  No alarming symptoms and exam, 

no acute neuro deficits.  X-rays reveal a mild loss of height in T12 and T11 

vertebrae, no fracture seen and lumbar spine.  I did offer patient something for

pain however she refused.  I discussed these findings with her.  I recommended 

heat to the area, gentle massage and to limit her lifting.  Trial of muscle 

relaxer and steriods.  She is agreeable to this.  She'll follow up with her 

primary care.  Return parameters were discussed with her and she verbalized 

understanding.  Case discussed with Dr. Potter.





Disposition


Clinical Impression: 


 Fall, Lumbar pain, Radicular pain of right lower extremity





Disposition: HOME SELF-CARE


Condition: Stable


Instructions (If sedation given, give patient instructions):  Low Back Strain 

(ED)


Additional Instructions: 


Please return to the Emergency Department if symptoms worsen or any other 

concerns.


Trial of steroids and muscle relaxers as discussed.  


Continue with heat and cold to the area.  


Follow-up with orthopedics if no improvement.


Prescriptions: 


Cyclobenzaprine [Flexeril] 5 mg PO TID PRN #15 tablet


 PRN Reason: Muscle Spasm


predniSONE 50 mg PO DAILY #5 tab


Is patient prescribed a controlled substance at d/c from ED?: No


Referrals: 


Erendira Morelos MD [Primary Care Provider] - 1-2 days


Jose Bucio MD [STAFF PHYSICIAN] - 1-2 days


Time of Disposition: 17:39

## 2022-10-04 ENCOUNTER — HOSPITAL ENCOUNTER (OUTPATIENT)
Dept: HOSPITAL 47 - RADUSWWP | Age: 77
Discharge: HOME | End: 2022-10-04
Attending: FAMILY MEDICINE
Payer: MEDICARE

## 2022-10-04 DIAGNOSIS — R22.32: Primary | ICD-10-CM

## 2022-10-04 NOTE — US
EXAMINATION TYPE: US extremity nonvasc mass  LT

 

DATE OF EXAM: 10/4/2022

 

COMPARISON: NONE

 

CLINICAL HISTORY: R22.32 Localized swelling, mass and lump, left upp. Left wrist area of pain

 

Scanned left anterior wrist at patient's area of pain - appears wnl at this time

 

Targeted ultrasound shows prominent but patent superficial vessel. No concerning solid or cystic mass
 or fluid collection seen on images saved.

 

 

 

IMPRESSION:  As above.